# Patient Record
Sex: MALE | Race: WHITE | NOT HISPANIC OR LATINO | Employment: OTHER | URBAN - NONMETROPOLITAN AREA
[De-identification: names, ages, dates, MRNs, and addresses within clinical notes are randomized per-mention and may not be internally consistent; named-entity substitution may affect disease eponyms.]

---

## 2021-06-10 ENCOUNTER — TELEPHONE (OUTPATIENT)
Dept: URGENT CARE | Facility: CLINIC | Age: 66
End: 2021-06-10

## 2021-06-10 NOTE — TELEPHONE ENCOUNTER
There was no answer on patient's home phone. Called the mobile number given and that was his brother, Myles Nixon's number. He gave his brother's date of birth and reports that he has been checking on him. Patient gave permission for him to be a  when Myles brought him to clinic on 6/9/21. COVID positive results were shared with brother. He reports speaking to patient's mother this morning and her informing him patient was feeling some better, continuing to take his medications. Patient's brother has been vaccinated and is not symptomatic. Patient should quarantine through 6/16/21. He agrees to inform him of test results and have him back here or to PCP if no improvment, or ER if in distress.

## 2021-06-13 DIAGNOSIS — J98.4 LUNG ABNORMALITY: Primary | ICD-10-CM

## 2021-06-22 ENCOUNTER — OFFICE VISIT (OUTPATIENT)
Dept: INTERNAL MEDICINE | Facility: CLINIC | Age: 66
End: 2021-06-22

## 2021-06-22 VITALS
HEIGHT: 66 IN | SYSTOLIC BLOOD PRESSURE: 136 MMHG | BODY MASS INDEX: 43.07 KG/M2 | WEIGHT: 268 LBS | DIASTOLIC BLOOD PRESSURE: 85 MMHG | OXYGEN SATURATION: 94 % | RESPIRATION RATE: 16 BRPM | HEART RATE: 94 BPM | TEMPERATURE: 97.8 F

## 2021-06-22 DIAGNOSIS — U07.1 REAL TIME REVERSE TRANSCRIPTASE PCR POSITIVE FOR COVID-19 VIRUS: ICD-10-CM

## 2021-06-22 DIAGNOSIS — J15.9 COMMUNITY ACQUIRED BACTERIAL PNEUMONIA: Primary | ICD-10-CM

## 2021-06-22 PROCEDURE — 99203 OFFICE O/P NEW LOW 30 MIN: CPT | Performed by: INTERNAL MEDICINE

## 2021-06-22 RX ORDER — GUAIFENESIN AND CODEINE PHOSPHATE 100; 10 MG/5ML; MG/5ML
5 SOLUTION ORAL NIGHTLY PRN
Qty: 120 ML | Refills: 3 | Status: SHIPPED | OUTPATIENT
Start: 2021-06-22 | End: 2021-10-24

## 2021-06-22 NOTE — PROGRESS NOTES
Subjective   Dante Nixon is a 66 y.o. male.     Chief Complaint   Patient presents with   • Establish Care   • Cough   • Pneumonia       History of Present Illness   Patient is here for initial visit, he was seen in the urgent care initially on June 9, 2021 when he had a fever and was diagnosed with pneumonia and Covid positive he was put on Levaquin for 7 days which he completed, he was then seen again in the urgent care on June 18, 2021 and had a repeat chest x-ray and put on doxycycline with prednisone .  He is currently taking the doxycycline but he forgets to take the prednisone and is encouraged to do the same, he is also using his nebulizer, he does complain of cough, I have reviewed his chest x-rays and informed  the patient that his pneumonia is actually improving , his urgent care records from June 9, 2021 and June 18, 2021 have been reviewed, labs and x-rays have been reviewed and medication reconciled and updated, he continues to have a cough    Review of Systems   Constitutional: Negative for appetite change, fatigue and fever.   HENT: Negative for congestion, ear discharge, ear pain, sinus pressure and sore throat.    Eyes: Negative for pain and discharge.   Respiratory: Positive for cough. Negative for shortness of breath and wheezing.    Cardiovascular: Negative for chest pain, palpitations and leg swelling.   Gastrointestinal: Negative for abdominal pain, constipation, diarrhea, nausea and vomiting.   Endocrine: Negative for cold intolerance and heat intolerance.   Genitourinary: Negative for dysuria and flank pain.   Musculoskeletal: Negative for arthralgias and joint swelling.   Skin: Negative for pallor and rash.   Allergic/Immunologic: Negative for environmental allergies and food allergies.   Neurological: Negative for dizziness, weakness and numbness.   Hematological: Negative for adenopathy. Does not bruise/bleed easily.   Psychiatric/Behavioral: Negative for behavioral problems and  "dysphoric mood. The patient is not nervous/anxious.        History reviewed. No pertinent past medical history.    Past Surgical History:   Procedure Laterality Date   • APPENDECTOMY         Family History   Problem Relation Age of Onset   • Arthritis Mother         reports that he has never smoked. He has never used smokeless tobacco. He reports current alcohol use. He reports that he does not use drugs.    No Known Allergies        Current Outpatient Medications:   •  albuterol sulfate  (90 Base) MCG/ACT inhaler, Two puffs per day every 4-6 hours as needed for wheeze or shortness of breath., Disp: 18 g, Rfl: 0  •  doxycycline (MONODOX) 100 MG capsule, 1 po bid, Disp: 20 capsule, Rfl: 0  •  ipratropium-albuterol (DUO-NEB) 0.5-2.5 mg/3 ml nebulizer, Nebulize q 4 h prn wheezing / shortness of breath, Disp: 360 mL, Rfl: 0  •  predniSONE (DELTASONE) 10 MG (21) dose pack, Daily taper 6,5,4,3,2,1, Disp: 21 each, Rfl: 0  •  promethazine-dextromethorphan (PROMETHAZINE-DM) 6.25-15 MG/5ML syrup, Take 5 mL by mouth 4 (Four) Times a Day As Needed for Cough., Disp: 118 mL, Rfl: 0  •  guaiFENesin-codeine (GUAIFENESIN AC) 100-10 MG/5ML liquid, Take 5 mL by mouth At Night As Needed for Cough., Disp: 120 mL, Rfl: 3      Objective   Blood pressure 136/85, pulse 94, temperature 97.8 °F (36.6 °C), resp. rate 16, height 167.6 cm (65.98\"), weight 122 kg (268 lb), SpO2 94 %.    Physical Exam  Vitals and nursing note reviewed.   Constitutional:       General: He is not in acute distress.     Appearance: Normal appearance. He is not diaphoretic.   HENT:      Head: Normocephalic and atraumatic.      Right Ear: External ear normal.      Left Ear: External ear normal.      Nose: Nose normal.   Eyes:      Extraocular Movements: Extraocular movements intact.      Conjunctiva/sclera: Conjunctivae normal.   Neck:      Trachea: Trachea normal.   Cardiovascular:      Rate and Rhythm: Normal rate and regular rhythm.      Heart sounds: Normal " heart sounds.   Pulmonary:      Effort: Pulmonary effort is normal. No respiratory distress.   Abdominal:      General: Abdomen is flat.   Musculoskeletal:      Cervical back: Neck supple.      Right lower leg: Edema present.      Left lower leg: Edema present.      Comments: Moves all limbs   Skin:     General: Skin is warm and dry.      Findings: No erythema.   Neurological:      Mental Status: He is alert and oriented to person, place, and time.      Comments: No gross motor or sensory deficits         Patient's Body mass index is 43.28 kg/m². indicating that he is morbidly obese (BMI > 40 or > 35 with obesity - related health condition). Obesity-related health conditions include the following: none. Obesity is newly identified. BMI is is above average; BMI management plan is completed. We discussed low calorie, low carb based diet program, portion control, increasing exercise and joining a fitness center or start home based exercise program..      Results for orders placed or performed during the hospital encounter of 06/09/21   COVID-19,[facility] ROUTINE, NP/OP SWAB IN TRANSPORT MEDIA OR ESWAB 72 HR TAT - Swab, Nasopharynx    Specimen: Nasopharynx; Swab   Result Value Ref Range    SARS-CoV-2, [facility] Detected (A) Not Detected   SARS-CoV-2, [facility]TT 2 DAY TAT - Swab, Nasopharynx    Specimen: Nasopharynx; Swab   Result Value Ref Range    [facility] SARS-COV-2, [facility] 2 DAY TAT Performed        UC with China Hill MD (06/18/2021)  UC with Kiley Kwok APRN (06/09/2021)  XR Chest 2 View (06/18/2021 08:48)  XR Chest 2 View (06/09/2021 10:33)        Assessment/Plan   Diagnoses and all orders for this visit:    1. Community acquired bacterial pneumonia (Primary)  -     guaiFENesin-codeine (GUAIFENESIN AC) 100-10 MG/5ML liquid; Take 5 mL by mouth At Night As Needed for Cough.  Dispense: 120 mL; Refill: 3    2. Real time reverse transcriptase PCR positive for COVID-19 virus      Plan:  1.  Community-acquired bacterial  pneumonia: Patient advised to complete doxycycline, continue using his prednisone as prescribed and nebulizer, chest x-rays have been reviewed with patient, he is also advised to ambulate, cough medication prescribed  2.  Covid positive: Currently afebrile, will monitor  I   spent 30 minutes caring for Dante  on this date of service. This time includes time spent by me in the following activities:preparing for the visit, reviewing tests, performing a medically appropriate examination and/or evaluation , counseling and educating the patient/family/caregiver, ordering medications, tests, or procedures and documenting information in the medical record         Sera Souza MD

## 2021-06-22 NOTE — PATIENT INSTRUCTIONS
MyPlate from USDA    MyPlate is an outline of a general healthy diet based on the 2010 Dietary Guidelines for Americans, from the U.[place]. Department of Agriculture (USDA). It sets guidelines for how much food you should eat from each food group based on your age, sex, and level of physical activity.  What are tips for following MyPlate?  To follow MyPlate recommendations:  · Eat a wide variety of fruits and vegetables, grains, and protein foods.  · Serve smaller portions and eat less food throughout the day.  · Limit portion sizes to avoid overeating.  · Enjoy your food.  · Get at least 150 minutes of exercise every week. This is about 30 minutes each day, 5 or more days per week.  It can be difficult to have every meal look like MyPlate. Think about MyPlate as eating guidelines for an entire day, rather than each individual meal.  Fruits and vegetables  · Make half of your plate fruits and vegetables.  · Eat many different colors of fruits and vegetables each day.  · For a 2,000 calorie daily food plan, eat:  ? 2½ cups of vegetables every day.  ? 2 cups of fruit every day.  · 1 cup is equal to:  ? 1 cup raw or cooked vegetables.  ? 1 cup raw fruit.  ? 1 medium-sized orange, apple, or banana.  ? 1 cup 100% fruit or vegetable juice.  ? 2 cups raw leafy greens, such as lettuce, spinach, or kale.  ? ½ cup dried fruit.  Grains  · One fourth of your plate should be grains.  · Make at least half of the grains you eat each day whole grains.  · For a 2,000 calorie daily food plan, eat 6 oz of grains every day.  · 1 oz is equal to:  ? 1 slice bread.  ? 1 cup cereal.  ? ½ cup cooked rice, cereal, or pasta.  Protein  · One fourth of your plate should be protein.  · Eat a wide variety of protein foods, including meat, poultry, fish, eggs, beans, nuts, and tofu.  · For a 2,000 calorie daily food plan, eat 5½ oz of protein every day.  · 1 oz is equal to:  ? 1 oz meat, poultry, or fish.  ? ¼ cup cooked beans.  ? 1 egg.  ? ½ oz nuts  or seeds.  ? 1 Tbsp peanut butter.  Dairy  · Drink fat-free or low-fat (1%) milk.  · Eat or drink dairy as a side to meals.  · For a 2,000 calorie daily food plan, eat or drink 3 cups of dairy every day.  · 1 cup is equal to:  ? 1 cup milk, yogurt, cottage cheese, or soy milk (soy beverage).  ? 2 oz processed cheese.  ? 1½ oz natural cheese.  Fats, oils, salt, and sugars  · Only small amounts of oils are recommended.  · Avoid foods that are high in calories and low in nutritional value (empty calories), like foods high in fat or added sugars.  · Choose foods that are low in salt (sodium). Choose foods that have less than 140 milligrams (mg) of sodium per serving.  · Drink water instead of sugary drinks. Drink enough water each day to keep your urine pale yellow.  Where to find support  · Work with your health care provider or a nutrition specialist (dietitian) to develop a customized eating plan that is right for you.  · Download an fabiana (mobile application) to help you track your daily food intake.  Where to find more information  · Go to ChooseMyPlate.gov for more information.  Summary  · MyPlate is a general guideline for healthy eating from the USDA. It is based on the 2010 Dietary Guidelines for Americans.  · In general, fruits and vegetables should take up ½ of your plate, grains should take up ¼ of your plate, and protein should take up ¼ of your plate.  This information is not intended to replace advice given to you by your health care provider. Make sure you discuss any questions you have with your health care provider.  Document Revised: 05/21/2020 Document Reviewed: 03/19/2018  Elsevier Patient Education © 2021 [employer] Inc.

## 2021-07-07 ENCOUNTER — OFFICE VISIT (OUTPATIENT)
Dept: INTERNAL MEDICINE | Facility: CLINIC | Age: 66
End: 2021-07-07

## 2021-07-07 ENCOUNTER — HOSPITAL ENCOUNTER (OUTPATIENT)
Dept: GENERAL RADIOLOGY | Facility: HOSPITAL | Age: 66
Discharge: HOME | End: 2021-07-07
Admitting: INTERNAL MEDICINE

## 2021-07-07 VITALS
WEIGHT: 259 LBS | HEART RATE: 94 BPM | RESPIRATION RATE: 16 BRPM | HEIGHT: 66 IN | BODY MASS INDEX: 41.62 KG/M2 | OXYGEN SATURATION: 95 % | DIASTOLIC BLOOD PRESSURE: 76 MMHG | TEMPERATURE: 98.6 F | SYSTOLIC BLOOD PRESSURE: 132 MMHG

## 2021-07-07 DIAGNOSIS — J12.82 PNEUMONIA DUE TO COVID-19 VIRUS: Primary | ICD-10-CM

## 2021-07-07 DIAGNOSIS — U07.1 PNEUMONIA DUE TO COVID-19 VIRUS: Primary | ICD-10-CM

## 2021-07-07 DIAGNOSIS — R60.0 LOCALIZED EDEMA: ICD-10-CM

## 2021-07-07 DIAGNOSIS — E78.2 MIXED HYPERLIPIDEMIA: ICD-10-CM

## 2021-07-07 PROCEDURE — 71046 X-RAY EXAM CHEST 2 VIEWS: CPT

## 2021-07-07 PROCEDURE — 99214 OFFICE O/P EST MOD 30 MIN: CPT | Performed by: INTERNAL MEDICINE

## 2021-07-07 RX ORDER — FUROSEMIDE 40 MG/1
40 TABLET ORAL DAILY
Qty: 30 TABLET | Refills: 1 | Status: SHIPPED | OUTPATIENT
Start: 2021-07-07 | End: 2021-10-24

## 2021-07-07 RX ORDER — POTASSIUM CHLORIDE 1500 MG/1
20 TABLET, FILM COATED, EXTENDED RELEASE ORAL DAILY
Qty: 30 TABLET | Refills: 1 | Status: SHIPPED | OUTPATIENT
Start: 2021-07-07 | End: 2021-10-24

## 2021-07-07 NOTE — PROGRESS NOTES
"Chief Complaint  Follow-up (on pneumonia)    Subjective          Dante Nixon presents to [facility]  History of Present Illness  Patient is here to follow-up on Covid 19 which he was diagnosed with last month, he then had Covid pneumonia for which he was seen in the urgent care and completed his antibiotics and steroids, he states he feels better but he now complains of leg swelling which has been worsening in both his legs    Objective   Vital Signs:   /76   Pulse 94   Temp 98.6 °F (37 °C)   Resp 16   Ht 167.6 cm (65.98\")   Wt 117 kg (259 lb)   SpO2 95%   BMI 41.82 kg/m²     Physical Exam  Vitals and nursing note reviewed.   Constitutional:       General: He is not in acute distress.     Appearance: Normal appearance. He is not diaphoretic.   HENT:      Head: Normocephalic and atraumatic.      Right Ear: External ear normal.      Left Ear: External ear normal.      Nose: Nose normal.   Eyes:      Extraocular Movements: Extraocular movements intact.      Conjunctiva/sclera: Conjunctivae normal.   Neck:      Trachea: Trachea normal.   Cardiovascular:      Rate and Rhythm: Normal rate and regular rhythm.      Heart sounds: Normal heart sounds.   Pulmonary:      Effort: Pulmonary effort is normal. No respiratory distress.   Abdominal:      General: Abdomen is flat.   Musculoskeletal:      Cervical back: Neck supple.      Right lower leg: Edema present.      Left lower leg: Edema present.      Comments: Moves all limbs   Skin:     General: Skin is warm and dry.      Findings: No erythema.   Neurological:      Mental Status: He is alert and oriented to person, place, and time.      Comments: No gross motor or sensory deficits        Result Review :                   Assessment and Plan    Diagnoses and all orders for this visit:    1. Pneumonia due to COVID-19 virus (Primary)  -     XR Chest PA & Lateral    2. Localized edema  -     furosemide (Lasix) 40 MG tablet; Take " 1 tablet by mouth Daily.  Dispense: 30 tablet; Refill: 1  -     potassium chloride (K-TAB) 20 MEQ tablet controlled-release ER tablet; Take 1 tablet by mouth Daily.  Dispense: 30 tablet; Refill: 1    3. Mixed hyperlipidemia  -     CBC & Differential  -     Comprehensive Metabolic Panel  -     Lipid Panel    Plan:  1.  mixed hyperlipidemia: will obtain   fasting CMP and lipid panel.  Diet and exercise counseled,    2.  Edema: We will start diuretic, low-sodium diet and obtain labs  3.  COVID-19 pneumonia: We will obtain chest x-ray  I spent 30 minutes caring for Dante on this date of service. This time includes time spent by me in the following activities:preparing for the visit, reviewing tests, performing a medically appropriate examination and/or evaluation , counseling and educating the patient/family/caregiver, ordering medications, tests, or procedures and documenting information in the medical record  Follow Up   Return in about 27 days (around 8/3/2021).  Patient was given instructions and counseling regarding his condition or for health maintenance advice. Please see specific information pulled into the AVS if appropriate.

## 2021-07-08 DIAGNOSIS — J18.9 PLEURAL EFFUSION ASSOCIATED WITH PULMONARY INFECTION: Primary | ICD-10-CM

## 2021-07-08 DIAGNOSIS — J91.8 PLEURAL EFFUSION ASSOCIATED WITH PULMONARY INFECTION: Primary | ICD-10-CM

## 2021-07-08 NOTE — PROGRESS NOTES
Chest xray -pneumonia has cleared up and he can ambulate and go outside, but he does have  small bilateral pleural effusions which will take a a little while to clear up but I will set him up to see pulmonology for the same

## 2021-08-16 ENCOUNTER — OFFICE VISIT (OUTPATIENT)
Dept: PULMONOLOGY | Facility: CLINIC | Age: 66
End: 2021-08-16

## 2021-08-16 ENCOUNTER — HOSPITAL ENCOUNTER (OUTPATIENT)
Dept: GENERAL RADIOLOGY | Facility: HOSPITAL | Age: 66
Discharge: HOME | End: 2021-08-16
Admitting: INTERNAL MEDICINE

## 2021-08-16 VITALS
SYSTOLIC BLOOD PRESSURE: 150 MMHG | OXYGEN SATURATION: 97 % | HEIGHT: 66 IN | DIASTOLIC BLOOD PRESSURE: 92 MMHG | WEIGHT: 254 LBS | BODY MASS INDEX: 40.82 KG/M2 | HEART RATE: 88 BPM | RESPIRATION RATE: 18 BRPM

## 2021-08-16 DIAGNOSIS — R93.89 ABNORMAL CXR: Primary | ICD-10-CM

## 2021-08-16 DIAGNOSIS — R06.83 SNORING: ICD-10-CM

## 2021-08-16 DIAGNOSIS — J90 PLEURAL EFFUSION: ICD-10-CM

## 2021-08-16 DIAGNOSIS — G47.19 EXCESSIVE DAYTIME SLEEPINESS: ICD-10-CM

## 2021-08-16 DIAGNOSIS — R60.9 EDEMA, UNSPECIFIED TYPE: ICD-10-CM

## 2021-08-16 DIAGNOSIS — G47.33 OBSTRUCTIVE SLEEP APNEA: ICD-10-CM

## 2021-08-16 DIAGNOSIS — R93.89 ABNORMAL CXR: ICD-10-CM

## 2021-08-16 DIAGNOSIS — E66.01 MORBID OBESITY, UNSPECIFIED OBESITY TYPE (HCC): ICD-10-CM

## 2021-08-16 PROCEDURE — 71046 X-RAY EXAM CHEST 2 VIEWS: CPT

## 2021-08-16 PROCEDURE — 99244 OFF/OP CNSLTJ NEW/EST MOD 40: CPT | Performed by: INTERNAL MEDICINE

## 2021-08-16 NOTE — PROGRESS NOTES
CONSULT NOTE    Requested by:   Sera Souza MD D'Costa, Gina, MD      Chief Complaint   Patient presents with   • Consult   • Breathing Problem       Subjective:  Dante Nixon is a 66 y.o. male.     History of Present Illness   Patient comes in today for consultation because of shortness of breath and abnormal chest x-ray.    The patient says that he developed CoVid in June and initially his cough was significant along with fever and chills.  The patient says that he was never admitted to the hospital but was told by his primary care provider that he had pneumonia with it.    The patient says that he continued to have shortness of breath and cough for a few weeks after infection resolved but lately shortness of breath has mostly resolved although he does have occasional mild cough.    Upon questioning, he is complaining of sleep disturbance. Patient says that for the past few years, he has had trouble with snoring.     he is also complaining of occasionally feeling sleepy while watching TV and sometimes while reading a book.    Patient also mentions occasional nights when he remembers having nightmares and has woken up due to the same.      he is not complaining of occasional headaches.    Patient's sleep schedule was reviewed. he drinks 2-3 cups/cans of caffeinated drinks per day.     he does have a family history of YENNIFER, in his brother.         The following portions of the patient's history were reviewed and updated as appropriate: allergies, current medications, past family history, past medical history, past social history and past surgical history.    Review of Systems   Constitutional: Negative for chills, fatigue and fever.   HENT: Positive for postnasal drip. Negative for rhinorrhea, sinus pressure, sneezing and sore throat.    Respiratory: Positive for cough. Negative for chest tightness, shortness of breath and wheezing.    Cardiovascular: Negative for palpitations and leg swelling.  "  Psychiatric/Behavioral: Negative for sleep disturbance.   All other systems reviewed and are negative.      No past medical history on file.    Social History     Tobacco Use   • Smoking status: Never Smoker   • Smokeless tobacco: Never Used   Substance Use Topics   • Alcohol use: Yes     Comment: social         Objective:  Visit Vitals  /92   Pulse 88   Resp 18   Ht 167.6 cm (66\")   Wt 115 kg (254 lb)   SpO2 97%   BMI 41.00 kg/m²       Physical Exam  Vitals reviewed.   Constitutional:       Appearance: He is well-developed.   HENT:      Head: Atraumatic.      Mouth/Throat:      Comments: Oropharynx was crowded.  Eyes:      Pupils: Pupils are equal, round, and reactive to light.   Neck:      Thyroid: No thyromegaly.      Vascular: No JVD.      Trachea: No tracheal deviation.      Comments: Increased adipose tissue.   Cardiovascular:      Rate and Rhythm: Normal rate and regular rhythm.   Pulmonary:      Effort: Pulmonary effort is normal. No respiratory distress.      Breath sounds: Normal breath sounds. No wheezing.   Musculoskeletal:         General: Normal range of motion.      Right lower leg: Edema present.      Left lower leg: Edema present.      Comments: Gait was normal.   Skin:     General: Skin is warm and dry.   Neurological:      Mental Status: He is alert and oriented to person, place, and time.   Psychiatric:         Behavior: Behavior normal.         Assessment/Plan:  Diagnoses and all orders for this visit:    1. Abnormal CXR (Primary)  -     XR Chest PA & Lateral; Future    2. Pleural effusion  -     XR Chest PA & Lateral; Future    3. Obstructive sleep apnea  -     Home Sleep Study; Future    4. Morbid obesity, unspecified obesity type (CMS/HCC)  -     Home Sleep Study; Future    5. Snoring  -     Home Sleep Study; Future    6. Excessive daytime sleepiness  -     Home Sleep Study; Future    7. Edema, unspecified type        Return in about 10 weeks (around 10/25/2021) for Recheck, Imaging, " Give Sleep quest, Sleep study, For Analia.    DISCUSSION(if any):  Last chest x-ray was reviewed personally and the results were shared with the patient.  Images reviewed personally.   Results for orders placed in visit on 07/07/21    XR Chest PA & Lateral    Narrative  EXAMINATION: XR CHEST PA AND LATERAL - 07/07/2021    INDICATION: U07.1-COVID-19; J12.82-Pneumonia due to Coronavirus disease  2019. Follow-up pneumonia.    COMPARISON: None.    FINDINGS: PA and lateral chest reveals small bilateral pleural  effusions, left greater than right. Degenerative changes seen within the  spine. The heart is borderline. Lung fields are otherwise clear.    Impression  Small bilateral pleural effusions left greater than right.  The remainder of the lung fields are grossly clear.    DICTATED:   07/07/2021  EDITED/ls :   07/07/2021    This report was finalized on 7/8/2021 5:16 PM by Dr. Jazlyn Roy MD.    I have also reviewed his provider's office note that mentions CoVid pneumonia and documented antibiotics and steroids, given by . It also     Reviewed labs. CoVid test was positive.     ===========================  ===========================    Pleural effusion likely due to CoVid related pneumonia?    CXR today.    He was asked to resume Lasix for 1 week, given significant edema.    He was asked to call his PCP, if the edema doesn't resolve or persists after taking Lasix for 1 week.    He had no issues with Lasix, when he took it a few weeks ago.    He was reminded about CoVid vaccine.    Sleep questionnaire was provided to the patient    The pathophysiology of sleep apnea was discussed, with the patient.     We will encourage him to schedule the sleep study soon.     The patient was made aware of the limitation of the home sleep study, whereby it may underestimate the true AHI and also carries a low sensitivity.  I have informed him that even if the home sleep study is negative, we may suggest an in lab sleep study  to completely and definitively rule out/in sleep apnea.  The patient has understood.  This was communicated to the patient, in case home study is to be requested.    The patient is agreeable to try CPAP/BiPAP, if needed.     Patient was educated on good sleep hygiene measures and voiced understanding of the same.     Patient was given reading material regarding sleep apnea    Patient was counseled regarding weight loss.       Dictated utilizing Dragon dictation.    This document was electronically signed by Sujata Mccollum MD on 08/16/21 at 11:54 EDT

## 2021-10-11 ENCOUNTER — APPOINTMENT (OUTPATIENT)
Dept: SLEEP MEDICINE | Facility: HOSPITAL | Age: 66
End: 2021-10-11

## 2021-10-25 ENCOUNTER — PREP FOR SURGERY (OUTPATIENT)
Dept: OTHER | Facility: HOSPITAL | Age: 66
End: 2021-10-25

## 2021-10-25 ENCOUNTER — OFFICE VISIT (OUTPATIENT)
Dept: ORTHOPEDIC SURGERY | Facility: CLINIC | Age: 66
End: 2021-10-25

## 2021-10-25 VITALS — BODY MASS INDEX: 41.56 KG/M2 | WEIGHT: 258.6 LBS | TEMPERATURE: 98.4 F | HEIGHT: 66 IN

## 2021-10-25 DIAGNOSIS — S52.572A OTHER CLOSED INTRA-ARTICULAR FRACTURE OF DISTAL END OF LEFT RADIUS, INITIAL ENCOUNTER: Primary | ICD-10-CM

## 2021-10-25 DIAGNOSIS — S69.92XA WRIST INJURY, LEFT, INITIAL ENCOUNTER: Primary | ICD-10-CM

## 2021-10-25 PROBLEM — S52.502A CLOSED FRACTURE OF LEFT DISTAL RADIUS: Status: ACTIVE | Noted: 2021-10-25

## 2021-10-25 PROCEDURE — 99203 OFFICE O/P NEW LOW 30 MIN: CPT | Performed by: ORTHOPAEDIC SURGERY

## 2021-10-25 RX ORDER — HYDROCODONE BITARTRATE AND ACETAMINOPHEN 5; 325 MG/1; MG/1
1 TABLET ORAL EVERY 8 HOURS PRN
Qty: 21 TABLET | Refills: 0 | Status: SHIPPED | OUTPATIENT
Start: 2021-10-25 | End: 2021-12-27

## 2021-10-25 RX ORDER — CEFAZOLIN SODIUM 2 G/50ML
2 SOLUTION INTRAVENOUS
Status: CANCELLED | OUTPATIENT
Start: 2021-10-26 | End: 2021-10-27

## 2021-10-25 NOTE — PROGRESS NOTES
"Dante Nixon is a 66 y.o. male referred by [facility] and is being seen for consultation today to evaluate and treat a traumatic left wrist fracture condition.  Injury of the Left Wrist (Referred by [facility] for left wrist injury. )       CHIEF COMPLAINT:     Left wrist pain after accidental mechanical fall.    History of Present Illness      Patient states he was on his back porch on 10/21/21 after dark, and the security light did not come on, and he misjudged the porch steps and fell down 2 steps, landed on his outstretched left arm/wrist/hand. No reported head trauma or loss of consciousness.  He decided to wait three days telling me he was initially not sure if his wrist was broken and then with persistent pain and swelling he went to [facility] on 10/24/21. Exam and imaging reveal an acute, closed, severely comminuted, displaced, unstable left distal radius fracture.  Of note, patient has past medical history that includes diabetes, hypertension, anxiety disorder, and patient has recently recovered from Covid pneumonia in June, 2021.      PAST MEDICAL HISTORY:    Past Medical History:   Diagnosis Date   • Anxiety     Does not take medication for this   • Borderline diabetes 10/26/2021    Reported he has never been on medication for diabetes    • Fall 10/21/2021    Reslting in fracture of left wrist   • H/O cardiovascular stress test     Patient reported around 1984 and that he had an exercise stress test and all wnl's at that time   • History of COVID-19 06/09/2021   • History of pneumonia    • Hypertension     Patient reported \"it's a little bit high\" but reported he has never taken Rx for this    • Snores 10/26/2021    patient reported he has never had a sleep study   • Wears glasses     Rx glasses PRN       Past Surgical History:   Procedure Laterality Date   • APPENDECTOMY      Reported at around age 12   • COLONOSCOPY      reported many years ago        Family History   Problem Relation Age " "of Onset   • Arthritis Mother        Social History     Socioeconomic History   • Marital status: Single   Tobacco Use   • Smoking status: Never Smoker   • Smokeless tobacco: Never Used   Vaping Use   • Vaping Use: Never used   Substance and Sexual Activity   • Alcohol use: Yes     Alcohol/week: 4.0 standard drinks     Types: 4 Cans of beer per week     Comment: Reported no Hx of ETOH abuse   • Drug use: Never   • Sexual activity: Defer          Current Outpatient Medications:   •  HYDROcodone-acetaminophen (NORCO) 5-325 MG per tablet, Take 1 tablet by mouth Every 8 (Eight) Hours As Needed for Other (PRN PAIN)., Disp: 21 tablet, Rfl: 0    No Known Allergies    Review of Systems   Constitutional: Negative for fever.   Musculoskeletal: Positive for arthralgias and joint swelling. Negative for gait problem.   Skin: Positive for color change (mild ecchymosis distal forearm. 2+ radial pulse, brisk cap refill.). Negative for wound.   Neurological: Positive for weakness (left wrist, no focal motor deficit though limited due to pain and swelling,  No tense compartments, no pain with passive digital stretch, no sign of compartment syndrome. ).     I have reviewed the medical and surgical history, family history, social history, medications, and/or allergies, and the review of systems of this report.    PHYSICAL EXAMINATION:    Temp 98.4 °F (36.9 °C)   Ht 167.6 cm (66\")   Wt 117 kg (258 lb 9.6 oz)   BMI 41.74 kg/m²     GENERAL [x] Well developed  []Ill appearing [x] No acute distress    HEENT [x]No acute changes [x] Normocephalic, atraumatic    NECK [x]Supple  [] No midline tenderness    LUNGS [x]Clear bilaterally [x]No wheezes []Rhonchi [] Rales    HEART [x] Regular rate  [x] Regular rhythm [] Irregular    ABDOMEN [x] Soft [x] Not tender [x] Not distended [x] Normal sounds    VAS/EXT [x] Normal Pulses []Edema []Cyanosis             SKIN [x] Warm [x]Dry []Pink []Ecchymosis []Cool    NEURO [x] Sensation Intact [x] Motor " Grossly Intact [x] Pulse intact    MUSCULOSKELETAL [x]  As noted above in review of systems.      Physical Exam  Vitals reviewed.   Constitutional:       General: He is not in acute distress.     Appearance: He is well-developed.   Skin:     General: Skin is warm and dry.      Findings: No erythema or rash.   Neurological:      Mental Status: He is alert.      Gait: Gait is intact.   Psychiatric:         Speech: Speech normal.       Left Hand Exam     Tenderness   The patient is experiencing tenderness in the radial area, palmar area and dorsal area.     Other   Erythema: absent  Pulse: present           Neurologic Exam     Mental Status   Attention: normal.   Speech: speech is normal   Level of consciousness: alert  Knowledge: good.     Motor Exam   Overall muscle tone: normal    Gait, Coordination, and Reflexes     Gait  Gait: normal      Assessment/Plan     Independent Review of Radiographic Studies:    No new imaging done today.  Reviewed relevant prior imaging with patient again today.  Reviewed images and agree with radiologist interpretation and as detailed above in this report.    Laboratory and Other Studies:  Pre-op labs and studies ordered.     Medical Decision Making:    Stable initial neurovascular and fracture exam.  Surgical treatment of fracture and or dislocation.  Medications as prescribed and only as tolerated.  Physical and occupational therapy planned.  Decision for surgery and patient counseling as noted in report.  Activity, work and/or sports restrictions as appropriate.      Diagnoses and all orders for this visit:    1. Wrist injury, left, initial encounter (Primary)  -     HYDROcodone-acetaminophen (NORCO) 5-325 MG per tablet; Take 1 tablet by mouth Every 8 (Eight) Hours As Needed for Other (PRN PAIN).  Dispense: 21 tablet; Refill: 0        Assessment/Plan:    *SPECIAL INSTRUCTIONS:      Multi-modal analgesia.      Rehabilitation PT/OT planned.  [x]  If pre-op labs and tests warrant, plan  pre-operative medical clearance evaluation     Discussion of orthopaedic goals and activities and patient and/or guardian expressed appreciation.  Risk, benefits, and merits of treatment alternatives reviewed with the patient and/or guardian and questions answered  The nature of the proposed surgery reviewed with the patient including risks, benefits, rehabilitation, recovery timeframe, and outcome expectations  Ice, heat, and/or modalities as beneficial  Weight bearing parameters reviewed  Cast, splint or brace care and assistive device usage instructions given  Physical therapy program planned  Take prescribed medications as instructed only as tolerated      Risks, benefits, and alternative treatments discussed with the patient: [x] Yes [] No    Risk benefits and merits of the proposed surgery were discussed and the patient's questions were answered risks discussed including and not limited to:  Anesthesia reactions  Infection  Deep venous thrombosis and pulmonary embolus  Nerve, vascular or tendon injury  Fracture  Deformity  Stiffness  Weakness  Prosthesis and implant issues such as loosening, material wear or dislocation  Skin necrosis  Revision surgery or further treatment  Recurrence of problem and condition     Informed consent: [] Signed  [x] To be obtained at hospital  [] Both    Recommendations/Plan:    Exercise, medications, injections, other patient advice, and return appointment as noted.  Brace: No brace was given at today's visit.  Referral: No referrals made at today's visit.  Test/Studies: Pre-op labs and tests.  Surgery: Surgery proposed at this visit as noted.  Work/Activity Status: Usual activities, routine exercise as tolerated, no strenuous activity. No driving. No use of involved extremity.    PLANNED SURGICAL PROCEDURE: Left wrist open reduction internal fixation with volar plate.    Return in about 17 days (around 11/11/2021) for Post-op, recheck, XOA left wrist.  Patient is encouraged and  agreeable to call or return sooner for any issues or concerns.

## 2021-10-26 ENCOUNTER — HOSPITAL ENCOUNTER (OUTPATIENT)
Dept: GENERAL RADIOLOGY | Facility: HOSPITAL | Age: 66
Discharge: HOME | End: 2021-10-26

## 2021-10-26 ENCOUNTER — PRE-ADMISSION TESTING (OUTPATIENT)
Dept: PREADMISSION TESTING | Facility: HOSPITAL | Age: 66
End: 2021-10-26

## 2021-10-26 VITALS — WEIGHT: 255 LBS | BODY MASS INDEX: 40.98 KG/M2 | HEIGHT: 66 IN

## 2021-10-26 DIAGNOSIS — S52.572A OTHER CLOSED INTRA-ARTICULAR FRACTURE OF DISTAL END OF LEFT RADIUS, INITIAL ENCOUNTER: ICD-10-CM

## 2021-10-26 LAB
ALBUMIN SERPL-MCNC: 4.5 G/DL (ref 3.5–5.2)
ALBUMIN/GLOB SERPL: 1.2 G/DL
ALP SERPL-CCNC: 72 U/L (ref 39–117)
ALT SERPL W P-5'-P-CCNC: 14 U/L (ref 1–41)
ANION GAP SERPL CALCULATED.3IONS-SCNC: 12.1 MMOL/L (ref 5–15)
AST SERPL-CCNC: 16 U/L (ref 1–40)
BACTERIA UR QL AUTO: ABNORMAL /HPF
BASOPHILS # BLD AUTO: 0.05 10*3/MM3 (ref 0–0.2)
BASOPHILS NFR BLD AUTO: 0.5 % (ref 0–1.5)
BILIRUB SERPL-MCNC: 0.6 MG/DL (ref 0–1.2)
BILIRUB UR QL STRIP: NEGATIVE
BUN SERPL-MCNC: 16 MG/DL (ref 8–23)
BUN/CREAT SERPL: 17.8 (ref 7–25)
CALCIUM SPEC-SCNC: 10 MG/DL (ref 8.6–10.5)
CHLORIDE SERPL-SCNC: 101 MMOL/L (ref 98–107)
CLARITY UR: CLEAR
CO2 SERPL-SCNC: 25.9 MMOL/L (ref 22–29)
COD CRY URNS QL: ABNORMAL /HPF
COLOR UR: YELLOW
CREAT SERPL-MCNC: 0.9 MG/DL (ref 0.76–1.27)
DEPRECATED RDW RBC AUTO: 44 FL (ref 37–54)
EOSINOPHIL # BLD AUTO: 0.11 10*3/MM3 (ref 0–0.4)
EOSINOPHIL NFR BLD AUTO: 1 % (ref 0.3–6.2)
ERYTHROCYTE [DISTWIDTH] IN BLOOD BY AUTOMATED COUNT: 14.5 % (ref 12.3–15.4)
GFR SERPL CREATININE-BSD FRML MDRD: 85 ML/MIN/1.73
GLOBULIN UR ELPH-MCNC: 3.7 GM/DL
GLUCOSE SERPL-MCNC: 121 MG/DL (ref 65–99)
GLUCOSE UR STRIP-MCNC: NEGATIVE MG/DL
HCT VFR BLD AUTO: 42.1 % (ref 37.5–51)
HGB BLD-MCNC: 14.3 G/DL (ref 13–17.7)
HGB UR QL STRIP.AUTO: NEGATIVE
HYALINE CASTS UR QL AUTO: ABNORMAL /LPF
IMM GRANULOCYTES # BLD AUTO: 0.03 10*3/MM3 (ref 0–0.05)
IMM GRANULOCYTES NFR BLD AUTO: 0.3 % (ref 0–0.5)
KETONES UR QL STRIP: ABNORMAL
LEUKOCYTE ESTERASE UR QL STRIP.AUTO: NEGATIVE
LYMPHOCYTES # BLD AUTO: 1.84 10*3/MM3 (ref 0.7–3.1)
LYMPHOCYTES NFR BLD AUTO: 17 % (ref 19.6–45.3)
MCH RBC QN AUTO: 28.4 PG (ref 26.6–33)
MCHC RBC AUTO-ENTMCNC: 34 G/DL (ref 31.5–35.7)
MCV RBC AUTO: 83.5 FL (ref 79–97)
MONOCYTES # BLD AUTO: 0.93 10*3/MM3 (ref 0.1–0.9)
MONOCYTES NFR BLD AUTO: 8.6 % (ref 5–12)
MUCOUS THREADS URNS QL MICRO: ABNORMAL /HPF
NEUTROPHILS NFR BLD AUTO: 7.89 10*3/MM3 (ref 1.7–7)
NEUTROPHILS NFR BLD AUTO: 72.6 % (ref 42.7–76)
NITRITE UR QL STRIP: NEGATIVE
NRBC BLD AUTO-RTO: 0 /100 WBC (ref 0–0.2)
PH UR STRIP.AUTO: 6.5 [PH] (ref 5–8)
PLATELET # BLD AUTO: 284 10*3/MM3 (ref 140–450)
PMV BLD AUTO: 10.5 FL (ref 6–12)
POTASSIUM SERPL-SCNC: 3.8 MMOL/L (ref 3.5–5.2)
PROT SERPL-MCNC: 8.2 G/DL (ref 6–8.5)
PROT UR QL STRIP: ABNORMAL
RBC # BLD AUTO: 5.04 10*6/MM3 (ref 4.14–5.8)
RBC # UR: ABNORMAL /HPF
REF LAB TEST METHOD: ABNORMAL
SODIUM SERPL-SCNC: 139 MMOL/L (ref 136–145)
SP GR UR STRIP: 1.02 (ref 1–1.03)
SQUAMOUS #/AREA URNS HPF: ABNORMAL /HPF
UROBILINOGEN UR QL STRIP: ABNORMAL
WBC # BLD AUTO: 10.85 10*3/MM3 (ref 3.4–10.8)
WBC UR QL AUTO: ABNORMAL /HPF

## 2021-10-26 PROCEDURE — 93005 ELECTROCARDIOGRAM TRACING: CPT

## 2021-10-26 PROCEDURE — 87081 CULTURE SCREEN ONLY: CPT

## 2021-10-26 PROCEDURE — C9803 HOPD COVID-19 SPEC COLLECT: HCPCS

## 2021-10-26 PROCEDURE — 85025 COMPLETE CBC W/AUTO DIFF WBC: CPT

## 2021-10-26 PROCEDURE — U0004 COV-19 TEST NON-CDC HGH THRU: HCPCS

## 2021-10-26 PROCEDURE — 71046 X-RAY EXAM CHEST 2 VIEWS: CPT

## 2021-10-26 PROCEDURE — 93010 ELECTROCARDIOGRAM REPORT: CPT | Performed by: INTERNAL MEDICINE

## 2021-10-26 PROCEDURE — 81001 URINALYSIS AUTO W/SCOPE: CPT

## 2021-10-26 PROCEDURE — 36415 COLL VENOUS BLD VENIPUNCTURE: CPT

## 2021-10-26 PROCEDURE — 80053 COMPREHEN METABOLIC PANEL: CPT

## 2021-10-27 LAB
QT INTERVAL: 416 MS
QTC INTERVAL: 468 MS
SARS-COV-2 RNA NOSE QL NAA+PROBE: NOT DETECTED

## 2021-10-28 ENCOUNTER — HOSPITAL ENCOUNTER (OUTPATIENT)
Facility: HOSPITAL | Age: 66
Setting detail: HOSPITAL OUTPATIENT SURGERY
Discharge: HOME | End: 2021-10-28
Attending: ORTHOPAEDIC SURGERY | Admitting: ORTHOPAEDIC SURGERY

## 2021-10-28 ENCOUNTER — ANESTHESIA EVENT (OUTPATIENT)
Dept: PERIOP | Facility: HOSPITAL | Age: 66
End: 2021-10-28

## 2021-10-28 ENCOUNTER — APPOINTMENT (OUTPATIENT)
Dept: GENERAL RADIOLOGY | Facility: HOSPITAL | Age: 66
End: 2021-10-28

## 2021-10-28 ENCOUNTER — APPOINTMENT (OUTPATIENT)
Dept: ULTRASOUND IMAGING | Facility: HOSPITAL | Age: 66
End: 2021-10-28

## 2021-10-28 ENCOUNTER — ANESTHESIA (OUTPATIENT)
Dept: PERIOP | Facility: HOSPITAL | Age: 66
End: 2021-10-28

## 2021-10-28 VITALS
OXYGEN SATURATION: 98 % | SYSTOLIC BLOOD PRESSURE: 141 MMHG | TEMPERATURE: 97.2 F | HEART RATE: 90 BPM | RESPIRATION RATE: 20 BRPM | DIASTOLIC BLOOD PRESSURE: 96 MMHG

## 2021-10-28 DIAGNOSIS — S52.572A OTHER CLOSED INTRA-ARTICULAR FRACTURE OF DISTAL END OF LEFT RADIUS, INITIAL ENCOUNTER: ICD-10-CM

## 2021-10-28 LAB — MRSA SPEC QL CULT: NORMAL

## 2021-10-28 PROCEDURE — 25010000002 MIDAZOLAM PER 1MG: Performed by: NURSE ANESTHETIST, CERTIFIED REGISTERED

## 2021-10-28 PROCEDURE — C1713 ANCHOR/SCREW BN/BN,TIS/BN: HCPCS | Performed by: ORTHOPAEDIC SURGERY

## 2021-10-28 PROCEDURE — 25609 OPTX DST RD XART FX/EP SEP3+: CPT | Performed by: ORTHOPAEDIC SURGERY

## 2021-10-28 PROCEDURE — 0 CEFAZOLIN SODIUM-DEXTROSE 2-3 GM-%(50ML) RECONSTITUTED SOLUTION: Performed by: ORTHOPAEDIC SURGERY

## 2021-10-28 PROCEDURE — 25010000002 FENTANYL CITRATE (PF) 50 MCG/ML SOLUTION: Performed by: NURSE ANESTHETIST, CERTIFIED REGISTERED

## 2021-10-28 PROCEDURE — 0 CEFAZOLIN PER 500 MG: Performed by: ORTHOPAEDIC SURGERY

## 2021-10-28 PROCEDURE — 25010000002 PROPOFOL 10 MG/ML EMULSION: Performed by: NURSE ANESTHETIST, CERTIFIED REGISTERED

## 2021-10-28 PROCEDURE — 76000 FLUOROSCOPY <1 HR PHYS/QHP: CPT

## 2021-10-28 DEVICE — SCRW LK VA W STRDRV 2.4X14MM: Type: IMPLANTABLE DEVICE | Site: WRIST | Status: FUNCTIONAL

## 2021-10-28 DEVICE — IMPLANTABLE DEVICE: Type: IMPLANTABLE DEVICE | Site: WRIST | Status: FUNCTIONAL

## 2021-10-28 DEVICE — SCRW CORT S/TAP STRDRV 2.4X24MM: Type: IMPLANTABLE DEVICE | Site: WRIST | Status: FUNCTIONAL

## 2021-10-28 DEVICE — SCRW CORT S/TAP STRDRV 2.4X20MM: Type: IMPLANTABLE DEVICE | Site: WRIST | Status: FUNCTIONAL

## 2021-10-28 DEVICE — SCRW LK VA W STRDRV 2.4X16MM: Type: IMPLANTABLE DEVICE | Site: WRIST | Status: FUNCTIONAL

## 2021-10-28 DEVICE — SCRW CORT S/TAP STRDRV 2.7X14MM: Type: IMPLANTABLE DEVICE | Site: WRIST | Status: FUNCTIONAL

## 2021-10-28 DEVICE — SCRW LK VA W STRDRV 2.4X22MM: Type: IMPLANTABLE DEVICE | Site: WRIST | Status: FUNCTIONAL

## 2021-10-28 RX ORDER — CEFAZOLIN SODIUM 2 G/50ML
2 SOLUTION INTRAVENOUS
Status: DISCONTINUED | OUTPATIENT
Start: 2021-10-29 | End: 2021-10-28

## 2021-10-28 RX ORDER — MIDAZOLAM HYDROCHLORIDE 2 MG/2ML
INJECTION, SOLUTION INTRAMUSCULAR; INTRAVENOUS AS NEEDED
Status: DISCONTINUED | OUTPATIENT
Start: 2021-10-28 | End: 2021-10-28 | Stop reason: SURG

## 2021-10-28 RX ORDER — CEFAZOLIN SODIUM 2 G/50ML
2 SOLUTION INTRAVENOUS
Status: COMPLETED | OUTPATIENT
Start: 2021-10-28 | End: 2021-10-28

## 2021-10-28 RX ORDER — SODIUM CHLORIDE 0.9 % (FLUSH) 0.9 %
10 SYRINGE (ML) INJECTION AS NEEDED
Status: DISCONTINUED | OUTPATIENT
Start: 2021-10-28 | End: 2021-10-28 | Stop reason: HOSPADM

## 2021-10-28 RX ORDER — BUPIVACAINE HCL/0.9 % NACL/PF 0.125 %
PLASTIC BAG, INJECTION (ML) EPIDURAL AS NEEDED
Status: DISCONTINUED | OUTPATIENT
Start: 2021-10-28 | End: 2021-10-28 | Stop reason: SURG

## 2021-10-28 RX ORDER — BUPIVACAINE HYDROCHLORIDE 5 MG/ML
INJECTION, SOLUTION EPIDURAL; INTRACAUDAL AS NEEDED
Status: DISCONTINUED | OUTPATIENT
Start: 2021-10-28 | End: 2021-10-28 | Stop reason: SURG

## 2021-10-28 RX ORDER — FENTANYL CITRATE 50 UG/ML
INJECTION, SOLUTION INTRAMUSCULAR; INTRAVENOUS AS NEEDED
Status: DISCONTINUED | OUTPATIENT
Start: 2021-10-28 | End: 2021-10-28 | Stop reason: SURG

## 2021-10-28 RX ORDER — LIDOCAINE HYDROCHLORIDE 20 MG/ML
INJECTION, SOLUTION INFILTRATION; PERINEURAL AS NEEDED
Status: DISCONTINUED | OUTPATIENT
Start: 2021-10-28 | End: 2021-10-28 | Stop reason: SURG

## 2021-10-28 RX ORDER — SODIUM CHLORIDE, SODIUM LACTATE, POTASSIUM CHLORIDE, CALCIUM CHLORIDE 600; 310; 30; 20 MG/100ML; MG/100ML; MG/100ML; MG/100ML
1000 INJECTION, SOLUTION INTRAVENOUS CONTINUOUS
Status: DISCONTINUED | OUTPATIENT
Start: 2021-10-28 | End: 2021-10-28 | Stop reason: HOSPADM

## 2021-10-28 RX ORDER — SODIUM CHLORIDE, SODIUM LACTATE, POTASSIUM CHLORIDE, CALCIUM CHLORIDE 600; 310; 30; 20 MG/100ML; MG/100ML; MG/100ML; MG/100ML
INJECTION, SOLUTION INTRAVENOUS CONTINUOUS PRN
Status: DISCONTINUED | OUTPATIENT
Start: 2021-10-28 | End: 2021-10-28 | Stop reason: SURG

## 2021-10-28 RX ADMIN — Medication 100 MCG: at 12:04

## 2021-10-28 RX ADMIN — SODIUM CHLORIDE, POTASSIUM CHLORIDE, SODIUM LACTATE AND CALCIUM CHLORIDE: 600; 310; 30; 20 INJECTION, SOLUTION INTRAVENOUS at 10:32

## 2021-10-28 RX ADMIN — Medication 100 MCG: at 11:49

## 2021-10-28 RX ADMIN — Medication 100 MCG: at 11:46

## 2021-10-28 RX ADMIN — FENTANYL CITRATE 50 MCG: 50 INJECTION INTRAMUSCULAR; INTRAVENOUS at 10:35

## 2021-10-28 RX ADMIN — BUPIVACAINE HYDROCHLORIDE 25 ML: 5 INJECTION, SOLUTION EPIDURAL; INTRACAUDAL; PERINEURAL at 10:20

## 2021-10-28 RX ADMIN — Medication 100 MCG: at 11:42

## 2021-10-28 RX ADMIN — FENTANYL CITRATE 100 MCG: 50 INJECTION INTRAMUSCULAR; INTRAVENOUS at 10:13

## 2021-10-28 RX ADMIN — CEFAZOLIN SODIUM 2 G: 2 SOLUTION INTRAVENOUS at 10:33

## 2021-10-28 RX ADMIN — LIDOCAINE HYDROCHLORIDE 10 ML: 20 INJECTION, SOLUTION INFILTRATION; PERINEURAL at 10:40

## 2021-10-28 RX ADMIN — FAMOTIDINE 20 MG: 10 INJECTION INTRAVENOUS at 09:51

## 2021-10-28 RX ADMIN — SODIUM CHLORIDE, POTASSIUM CHLORIDE, SODIUM LACTATE AND CALCIUM CHLORIDE: 600; 310; 30; 20 INJECTION, SOLUTION INTRAVENOUS at 11:50

## 2021-10-28 RX ADMIN — BUPIVACAINE HYDROCHLORIDE 10 ML: 5 INJECTION, SOLUTION EPIDURAL; INTRACAUDAL; PERINEURAL at 10:40

## 2021-10-28 RX ADMIN — MIDAZOLAM HYDROCHLORIDE 1 MG: 1 INJECTION, SOLUTION INTRAMUSCULAR; INTRAVENOUS at 10:33

## 2021-10-28 RX ADMIN — FENTANYL CITRATE 50 MCG: 50 INJECTION INTRAMUSCULAR; INTRAVENOUS at 11:00

## 2021-10-28 RX ADMIN — Medication 100 MCG: at 12:13

## 2021-10-28 RX ADMIN — SODIUM CHLORIDE, POTASSIUM CHLORIDE, SODIUM LACTATE AND CALCIUM CHLORIDE 1000 ML: 600; 310; 30; 20 INJECTION, SOLUTION INTRAVENOUS at 09:52

## 2021-10-28 RX ADMIN — Medication 100 MCG: at 11:56

## 2021-10-28 RX ADMIN — Medication 100 MCG: at 11:36

## 2021-10-28 RX ADMIN — LIDOCAINE HYDROCHLORIDE 5 ML: 20 INJECTION, SOLUTION INFILTRATION; PERINEURAL at 10:20

## 2021-10-28 RX ADMIN — MIDAZOLAM HYDROCHLORIDE 2 MG: 1 INJECTION, SOLUTION INTRAMUSCULAR; INTRAVENOUS at 10:13

## 2021-10-28 RX ADMIN — Medication 100 MCG: at 11:52

## 2021-10-28 RX ADMIN — PROPOFOL 25 MCG/KG/MIN: 10 INJECTION, EMULSION INTRAVENOUS at 10:59

## 2021-10-28 RX ADMIN — MIDAZOLAM HYDROCHLORIDE 1 MG: 1 INJECTION, SOLUTION INTRAMUSCULAR; INTRAVENOUS at 10:50

## 2021-10-28 NOTE — ANESTHESIA PROCEDURE NOTES
Peripheral Block      Patient reassessed immediately prior to procedure    Patient location during procedure: pre-op  Start time: 10/28/2021 10:08 AM  Stop time: 10/28/2021 10:28 AM  Reason for block: procedure for pain, at surgeon's request and post-op pain management  Performed by  CRNA: Guillermo Guzman, [place]  Assisted by: Kashif Romero CRNA  Preanesthetic Checklist  Completed: patient identified, IV checked, site marked, risks and benefits discussed, surgical consent, monitors and equipment checked, pre-op evaluation and timeout performed  Prep:  Pt Position: supine  Sterile barriers:alcohol skin prep, cap, mask, partial drape, gloves and washed/disinfected hands  Prep: ChloraPrep and air dry 3 min per clock  Patient monitoring: blood pressure monitoring, continuous pulse oximetry and EKG  Procedure    Sedation: yes  Performed under: MAC  Guidance:ultrasound guided and nerve stimulator    ULTRASOUND INTERPRETATION. Using ultrasound guidance a 21 G gauge needle was placed in close proximity to the nerve, at which point, under ultrasound guidance anesthetic was injected in the area of the nerve and spread of the anesthesia was seen on ultrasound in close proximity thereto.  There were no abnormalities seen on ultrasound; a digital image was taken; and the patient tolerated the procedure with no complications. Images:still images obtained, printed/placed on chart  Loss of twitch: 0.5 mA  Laterality:left  Block Type:infraclavicular  Injection Technique:single-shot  Needle Type:echogenic  Resistance on Injection: none          Medications  Comment:Marcaine 0.5% 25 ml lidocaine 2% 5 ml    Post Assessment  Injection Assessment: negative aspiration for heme, no paresthesia on injection and incremental injection  Patient Tolerance:comfortable throughout block  Complications:no  Additional Notes  Pt has difficult anatomy d/t obesity, large neck and shoulders, deep and high clavicle

## 2021-10-28 NOTE — ANESTHESIA POSTPROCEDURE EVALUATION
Patient: Dante Nixon    Procedure Summary     Date: 10/28/21 Room / Location: [facility] OR  /  [facility]    Anesthesia Start: 1033 Anesthesia Stop:     Procedure: Wrist open reduction internal fixation (Left Wrist) Diagnosis:       Other closed intra-articular fracture of distal end of left radius, initial encounter      (Other closed intra-articular fracture of distal end of left radius, initial encounter [S52.149H])    Surgeons: Jsoe Fierro MD Provider: Kashif Romero CRNA    Anesthesia Type: MAC, regional ASA Status: 3          Anesthesia Type: MAC, regional    Vitals  No vitals data found for the desired time range.          Post Anesthesia Care and Evaluation    Patient location during evaluation: PHASE II  Patient participation: complete - patient participated  Level of consciousness: awake  Pain score: 0  Pain management: adequate  Airway patency: patent  Anesthetic complications: No anesthetic complications  PONV Status: none  Cardiovascular status: acceptable  Respiratory status: acceptable and nasal cannula  Hydration status: acceptable    Comments: vsss resp spont, reflexes intact, responsive, report given to pacu nurse

## 2021-10-28 NOTE — ANESTHESIA PROCEDURE NOTES
Peripheral Block      Patient reassessed immediately prior to procedure    Patient location during procedure: OR  Reason for block: procedure for pain, at surgeon's request and post-op pain management  Performed by  CRNA: Jeferson Thompson CRNA  Assisted by: Kashif Romero CRNA  Preanesthetic Checklist  Completed: patient identified, IV checked, site marked, risks and benefits discussed, surgical consent, monitors and equipment checked, pre-op evaluation and timeout performed  Prep:  Pt Position: supine  Sterile barriers:alcohol skin prep, cap and gloves  Prep: ChloraPrep and air dry 3 min  Patient monitoring: blood pressure monitoring, continuous pulse oximetry and EKG  Procedure    Sedation: yes  Performed under: MAC  Guidance:ultrasound guided and nerve stimulator  Images:still images not obtained    Laterality:left  Block Type:infraclavicular  Injection Technique:single-shot  Needle Type:echogenic  Needle Gauge:21 G  Resistance on Injection: none          Medications  Comment:Marcaine 0.5% 10 ml and lidocaine 2% 10 ml    Post Assessment  Injection Assessment: negative aspiration for heme, no paresthesia on injection and incremental injection  Patient Tolerance:comfortable throughout block  Complications:no  Additional Notes  Touch up  l infraclavicular r block d/t previous spotty block, good spread noted around artery, difficult block d/t anatomy deep and pt obese

## 2021-10-28 NOTE — ANESTHESIA PREPROCEDURE EVALUATION
Anesthesia Evaluation     Patient summary reviewed and Nursing notes reviewed   NPO Solid Status: > 8 hours  NPO Liquid Status: > 8 hours           Airway   Dental      Pulmonary    (+) pneumonia resolved , sleep apnea,   (-) not a smoker  Cardiovascular     (+) hypertension,   CAD:  inc risk obese, ? jet, dm.      Neuro/Psych  (+) psychiatric history Anxiety,     GI/Hepatic/Renal/Endo    (+) obesity,   diabetes mellitus type 2 poorly controlled,     Musculoskeletal     (+) arthralgias, myalgias,   Abdominal    Substance History      OB/GYN          Other        ROS/Med Hx Other: Hx of neg stress test  Pt non compliant with meds  Labs reviewed   cxr nad  ekg sr irbbb  covid pneumonia July 2021                    Anesthesia Plan    ASA 3     MAC and regional   (Risks and benefits discussed including risk of aspiration, recall and dental damage. Discussed at length with pt, surgeon and MARYLIN Guzman Crna, pt wishes to be wide awake during procedure to watch. Surgeon and Crna informed pt that even with a good block that was not in the best interest for best pt and surgical outcome, but that minimal sedation would be used and increased sedation would be used upon pt and surgeon request and discomfort. Pt agrees to the anesthesia plan.  All patient questions answered.    Plan: left infraclavicular block with iv sedation    Will continue with plan of care.)  intravenous induction     Anesthetic plan, all risks, benefits, and alternatives have been provided, discussed and informed consent has been obtained with: patient.

## 2021-11-11 ENCOUNTER — OFFICE VISIT (OUTPATIENT)
Dept: ORTHOPEDIC SURGERY | Facility: CLINIC | Age: 66
End: 2021-11-11

## 2021-11-11 VITALS — BODY MASS INDEX: 40.98 KG/M2 | RESPIRATION RATE: 18 BRPM | HEIGHT: 66 IN | TEMPERATURE: 97.8 F | WEIGHT: 255 LBS

## 2021-11-11 DIAGNOSIS — Z98.890 S/P ORIF (OPEN REDUCTION INTERNAL FIXATION) FRACTURE: ICD-10-CM

## 2021-11-11 DIAGNOSIS — Z87.81 S/P ORIF (OPEN REDUCTION INTERNAL FIXATION) FRACTURE: ICD-10-CM

## 2021-11-11 DIAGNOSIS — S69.92XA WRIST INJURY, LEFT, INITIAL ENCOUNTER: Primary | ICD-10-CM

## 2021-11-11 DIAGNOSIS — S52.502A CLOSED FRACTURE OF DISTAL END OF LEFT RADIUS, UNSPECIFIED FRACTURE MORPHOLOGY, INITIAL ENCOUNTER: ICD-10-CM

## 2021-11-11 PROCEDURE — 99024 POSTOP FOLLOW-UP VISIT: CPT | Performed by: PHYSICIAN ASSISTANT

## 2021-11-11 NOTE — PROGRESS NOTES
"Subjective   Patient ID: Dante Nixon is a 66 y.o. right hand dominant male is here today for a post-operative visit.  Post-op of the Left Wrist (ORIF 10/28/21)            Patient presents for his first postop regarding left wrist ORIF.  Date of surgery 10/28/2021.  Patient does note some soreness and swelling.  Denies numbness or tingling    History of Present Illness      Pain controlled: [] no   [x] yes   Medication refill requested: [x] no   [] yes    Patient compliant with instructions: [] no   [x] yes   Other: Reports good progress since surgery.     Past Medical History:   Diagnosis Date   • Anxiety     Does not take medication for this   • Borderline diabetes 10/26/2021    Reported he has never been on medication for diabetes    • Fall 10/21/2021    Reslting in fracture of left wrist   • H/O cardiovascular stress test     Patient reported around 1984 and that he had an exercise stress test and all wnl's at that time   • History of COVID-19 06/09/2021   • History of pneumonia    • Hypertension     Patient reported \"it's a little bit high\" but reported he has never taken Rx for this    • Snores 10/26/2021    patient reported he has never had a sleep study   • Wears glasses     Rx glasses PRN        Past Surgical History:   Procedure Laterality Date   • APPENDECTOMY      Reported at around age 12   • COLONOSCOPY      reported many years ago    • ORIF WRIST FRACTURE Left 10/28/2021    Procedure: Wrist open reduction internal fixation;  Surgeon: Jose Fierro MD;  Location: [facility];  Service: Orthopedics;  Laterality: Left;       No Known Allergies    Review of Systems   Constitutional: Negative for diaphoresis, fever and unexpected weight change.   HENT: Negative for dental problem and sore throat.    Eyes: Negative for visual disturbance.   Respiratory: Negative for shortness of breath.    Cardiovascular: Negative for chest pain.   Gastrointestinal: Negative for abdominal pain, constipation, " "diarrhea, nausea and vomiting.   Genitourinary: Negative for difficulty urinating and frequency.   Musculoskeletal: Positive for arthralgias (left wrist).   Neurological: Negative for headaches.   Hematological: Does not bruise/bleed easily.     I have reviewed the medical and surgical history, family history, social history, medications, and/or allergies, and the review of systems of this report.    Objective   Temp 97.8 °F (36.6 °C) (Skin)   Resp 18   Ht 167.6 cm (66\")   Wt 116 kg (255 lb)   BMI 41.16 kg/m²       Signs of infection: [x] no                    [] yes   Drainage: [x] no                    [] yes   Incision: [x] healing well     []healed well   Motor exam intact: [] no                    [x] yes   Neurovascular exam intact: [] no                    [x] yes   Signs of compartment syndrome: [x] no                    [] yes   Signs of DVT: [x] no                    [] yes   Other:      Physical Exam  Ortho Exam    Extremity DVT signs are negative by clinical screen.  Neurologic Exam    Assessment/Plan      Independent Review of Radiographic Studies:    X-ray of the left wrist 2 view performed in the office independently reviewed for the evaluation of distal radius fracture healing status post ORIF.  Operating films are available and reviewed.  No evidence of interval displacement or hardware loosening           Procedures     Diagnoses and all orders for this visit:    1. Wrist injury, left, initial encounter (Primary)  -     XR Wrist 3+ View Left; Future    2. Closed fracture of distal end of left radius, unspecified fracture morphology, initial encounter    3. S/P ORIF (open reduction internal fixation) fracture         Recommendations/Plan:             Ultrasound: [x]not ordered         []order given to patient   Labs: [x]not ordered         []order given to patient   Weight Bearing status: []Full []WBAT []PWB [x]NWB []Other     Regular exercise as tolerated  Guided on proper techniques for " mobility, strength, agility and/or conditioning exercises  Weight bearing parameters reviewed  Physical therapy program ongoing  Take prescribed medications as instructed only as tolerated     Patient must continue using the Velcro wrist brace.  No lifting pushing or pulling with the surgical extremity.  Follow-up in 3 weeks x-ray on arrival  Patient is encouraged and agreeable to call or return sooner for any issues or concerns.

## 2021-12-08 ENCOUNTER — OFFICE VISIT (OUTPATIENT)
Dept: ORTHOPEDIC SURGERY | Facility: CLINIC | Age: 66
End: 2021-12-08

## 2021-12-08 VITALS — HEIGHT: 66 IN | HEART RATE: 99 BPM | WEIGHT: 263.6 LBS | BODY MASS INDEX: 42.36 KG/M2

## 2021-12-08 DIAGNOSIS — S52.502A CLOSED FRACTURE OF DISTAL END OF LEFT RADIUS, UNSPECIFIED FRACTURE MORPHOLOGY, INITIAL ENCOUNTER: Primary | ICD-10-CM

## 2021-12-08 PROCEDURE — 99024 POSTOP FOLLOW-UP VISIT: CPT | Performed by: PHYSICIAN ASSISTANT

## 2021-12-08 NOTE — PROGRESS NOTES
"Subjective   Patient ID: Dante Nixon is a 66 y.o. right hand dominant male is here today for a post-operative visit.  Post-op of the Left Wrist (S/P wrist ORIF 10/28/21.)          CHIEF COMPLAINT:    History of Present Illness      Pain controlled: [] no   [x] yes   Medication refill requested: [x] no   [] yes    Patient compliant with instructions: [] no   [x] yes   Other: Reports fair progress since surgery.     Past Medical History:   Diagnosis Date   • Anxiety     Does not take medication for this   • Borderline diabetes 10/26/2021    Reported he has never been on medication for diabetes    • Fall 10/21/2021    Reslting in fracture of left wrist   • H/O cardiovascular stress test     Patient reported around 1984 and that he had an exercise stress test and all wnl's at that time   • History of COVID-19 06/09/2021   • History of pneumonia    • Hypertension     Patient reported \"it's a little bit high\" but reported he has never taken Rx for this    • Snores 10/26/2021    patient reported he has never had a sleep study   • Wears glasses     Rx glasses PRN        Past Surgical History:   Procedure Laterality Date   • APPENDECTOMY      Reported at around age 12   • COLONOSCOPY      reported many years ago    • ORIF WRIST FRACTURE Left 10/28/2021    Procedure: Wrist open reduction internal fixation;  Surgeon: Jose Fierro MD;  Location: [facility];  Service: Orthopedics;  Laterality: Left;       No Known Allergies    Review of Systems   Constitutional: Negative for diaphoresis, fever and unexpected weight change.   HENT: Negative for dental problem and sore throat.    Eyes: Negative for visual disturbance.   Respiratory: Negative for shortness of breath.    Cardiovascular: Negative for chest pain.   Gastrointestinal: Negative for abdominal pain, constipation, diarrhea, nausea and vomiting.   Genitourinary: Negative for difficulty urinating and frequency.   Musculoskeletal: Positive for arthralgias. " "  Neurological: Negative for headaches.   Hematological: Does not bruise/bleed easily.   All other systems reviewed and are negative.    I have reviewed the medical and surgical history, family history, social history, medications, and/or allergies, and the review of systems of this report.    Objective   Pulse 99   Ht 167.6 cm (66\")   Wt 120 kg (263 lb 9.6 oz)   BMI 42.55 kg/m²       Signs of infection: [x] no                    [] yes   Drainage: [x] no                    [] yes   Incision: [x] healing well     []healed well   Motor exam intact: [] no                    [x] yes   Neurovascular exam intact: [] no                    [x] yes   Signs of compartment syndrome: [x] no                    [] yes   Signs of DVT: [x] no                    [] yes   Other:      Physical Exam  Vitals and nursing note reviewed.   Constitutional:       Appearance: Normal appearance.   Pulmonary:      Effort: Pulmonary effort is normal.   Neurological:      Mental Status: He is alert and oriented to person, place, and time.       Ortho Exam    Extremity DVT signs are negative by clinical screen.  Neurologic Exam     Mental Status   Oriented to person, place, and time.       Assessment/Plan     Independent Review of Radiographic Studies:    X-ray of the left wrist 3 view performed in the office independently reviewed for the evaluation of distal radius fracture healing.  Comparison films are available reviewed.  No evidence of hardware loosening.  There is still lacking callus and periosteal bridging on today's x-ray imaging.     Procedures     Diagnoses and all orders for this visit:    1. Closed fracture of distal end of left radius, unspecified fracture morphology, initial encounter (Primary)  -     XR Wrist 3+ View Left; Future         Recommendations/Plan:     Sutures Staples or Pins [] Removed today  [] At prior visit  [] Plan removal later   Physical therapy: []rehab facility  []outpatient referral  [] therapy ongoing "   Ultrasound: [x]not ordered         []order given to patient   Labs: [x]not ordered         []order given to patient   Weight Bearing status: []Full []WBAT [x]PWB []NWB []Other     Discussion of orthopaedic goals and activities and patient and/or guardian expressed appreciation.  Regular exercise as tolerated  Guided on proper techniques for mobility, strength, agility and/or conditioning exercises  Weight bearing parameters reviewed  Take prescribed medications as instructed only as tolerated    Patient politely declined the sugar tong Ortho-Glass splint to better immobilize the forearm.  He states he would like to continue trying the Velcro wrist brace.  I explained to him that movement of his elbow could be hindering the fracture from fully healing  He will also purchase over-the-counter men's One-A-Day vitamin that has calcium and vitamin D.  Recheck in 2 weeks x-ray on arrival    Patient is encouraged and agreeable to call or return sooner for any issues or concerns.

## 2021-12-27 ENCOUNTER — OFFICE VISIT (OUTPATIENT)
Dept: ORTHOPEDIC SURGERY | Facility: CLINIC | Age: 66
End: 2021-12-27

## 2021-12-27 VITALS — HEIGHT: 66 IN | WEIGHT: 260.4 LBS | TEMPERATURE: 98 F | BODY MASS INDEX: 41.85 KG/M2

## 2021-12-27 DIAGNOSIS — S52.502A CLOSED FRACTURE OF DISTAL END OF LEFT RADIUS, UNSPECIFIED FRACTURE MORPHOLOGY, INITIAL ENCOUNTER: Primary | ICD-10-CM

## 2021-12-27 PROCEDURE — 29105 APPLICATION LONG ARM SPLINT: CPT | Performed by: PHYSICIAN ASSISTANT

## 2021-12-27 PROCEDURE — 99024 POSTOP FOLLOW-UP VISIT: CPT | Performed by: PHYSICIAN ASSISTANT

## 2021-12-27 NOTE — PROGRESS NOTES
"Subjective   Patient ID: Dante Nixon is a 66 y.o. right hand dominant male is here today for a post-operative visit.  Post-op of the Left Wrist (Status post ORIF 10/28/21.)          History of Present Illness      Pain controlled:  Patient reports he is still having some pain to the left wrist with swelling   Medication refill requested: [x] no   [] yes    Patient compliant with instructions: [] no   [x] yes   Other: Reports fair progress since surgery.     Past Medical History:   Diagnosis Date   • Anxiety     Does not take medication for this   • Borderline diabetes 10/26/2021    Reported he has never been on medication for diabetes    • Fall 10/21/2021    Reslting in fracture of left wrist   • H/O cardiovascular stress test     Patient reported around 1984 and that he had an exercise stress test and all wnl's at that time   • History of COVID-19 06/09/2021   • History of pneumonia    • Hypertension     Patient reported \"it's a little bit high\" but reported he has never taken Rx for this    • Snores 10/26/2021    patient reported he has never had a sleep study   • Wears glasses     Rx glasses PRN        Past Surgical History:   Procedure Laterality Date   • APPENDECTOMY      Reported at around age 12   • COLONOSCOPY      reported many years ago    • ORIF WRIST FRACTURE Left 10/28/2021    Procedure: Wrist open reduction internal fixation;  Surgeon: Jose Fierro MD;  Location: [facility];  Service: Orthopedics;  Laterality: Left;       No Known Allergies    Review of Systems   Constitutional: Negative for diaphoresis, fever and unexpected weight change.   HENT: Negative for dental problem and sore throat.    Eyes: Negative for visual disturbance.   Respiratory: Negative for shortness of breath.    Cardiovascular: Negative for chest pain.   Gastrointestinal: Negative for abdominal pain, constipation, diarrhea, nausea and vomiting.   Genitourinary: Negative for difficulty urinating and frequency. " "  Musculoskeletal: Positive for arthralgias.   Neurological: Negative for headaches.   Hematological: Does not bruise/bleed easily.   All other systems reviewed and are negative.    I have reviewed the medical and surgical history, family history, social history, medications, and/or allergies, and the review of systems of this report.    Objective   Temp 98 °F (36.7 °C)   Ht 167.6 cm (66\")   Wt 118 kg (260 lb 6.4 oz)   BMI 42.03 kg/m²       Signs of infection: [x] no                    [] yes   Drainage: [x] no                    [] yes   Incision: [x] healing well     []healed well   Motor exam intact: [] no                    [x] yes   Neurovascular exam intact: [] no                    [x] yes   Signs of compartment syndrome: [x] no                    [] yes   Signs of DVT: [x] no                    [] yes   Other:  There is minor edema to the digits of the left hand.     Physical Exam  Left Hand Exam     Range of Motion   Wrist   Pronation: 50   Supination: 70     Muscle Strength   Wrist extension: 4/5   Wrist flexion: 4/5   :  4/5     Other   Erythema: absent  Scars: present  Sensation: normal  Pulse: present              Neurologic Exam    Assessment/Plan     Independent Review of Radiographic Studies:    X-ray of the left wrist 3 view performed in the office independently reviewed for the evaluation of distal radius fracture healing.  Comparison films are available and reviewed.  No evidence of interval displacement.  No evidence of hardware loosening.     Procedures     Diagnoses and all orders for this visit:    1. Closed fracture of distal end of left radius, unspecified fracture morphology, initial encounter (Primary)  -     XR Wrist 3+ View Left; Future         Recommendations/Plan:             Ultrasound: [x]not ordered         []order given to patient   Labs: [x]not ordered         []order given to patient   Weight Bearing status: []Full []WBAT []PWB [x]NWB []Other     Discussion of orthopaedic " goals and activities and patient and/or guardian expressed appreciation.  Regular exercise as tolerated  Guided on proper techniques for mobility, strength, agility and/or conditioning exercises  Weight bearing parameters reviewed  Take prescribed medications as instructed only as tolerated      Patient was placed in a sugar tong Ortho-Glass splint to the left upper extremity.  Patient neurovascularly intact     I would like to immobilize the patient in a sugar tong splint x2 weeks.  Plan to enroll in therapy after the 2-week immobilization to immobilize this  Patient is encouraged and agreeable to call or return sooner for any issues or concerns.

## 2021-12-31 NOTE — DISCHARGE INSTRUCTIONS
Dilia Thorpe DNP, CHACE    [address] [place] [address]     PATIENT NAME: Eleno Cervantes  : 2008  DATE: 21  MRN: [id number]      Billing Provider: Dilia Thorpe DNP, FNP  Level of Service:   Patient PCP Information     Provider PCP Type    Carmella Kearney MD General          Reason for Visit / Chief Complaint: Sore Throat, Fever, Emesis, Fatigue, Cough, and Nausea (Symptoms started yesterday.  Patient complained of sore throat, fatigue, nausea, vomiting, and low grade fever. Patient has been exposed to covid.)       Update PCP  Update Chief Complaint         History of Present Illness / Problem Focused Workflow     Eleno Cervantes presents to the clinic with Sore Throat, Fever, Emesis, Fatigue, Cough, and Nausea (Symptoms started yesterday.  Patient complained of sore throat, fatigue, nausea, vomiting, and low grade fever. Patient has been exposed to covid.)     Pt brought by dad c/o low grade temp and recent exposure to COVID.       Review of Systems     Review of Systems   Constitutional: Positive for fever. Negative for activity change, chills and fatigue.   HENT: Positive for nasal congestion, sinus pressure/congestion and sore throat. Negative for facial swelling and sneezing.    Respiratory: Positive for cough. Negative for chest tightness, shortness of breath and wheezing.    Cardiovascular: Negative for chest pain.   Gastrointestinal: Negative for nausea.        Medical / Social / Family History     Past Medical History:   Diagnosis Date    ADHD (attention deficit hyperactivity disorder)     Autism     Autism     Insomnia        History reviewed. No pertinent surgical history.    Social History    reports that he has never smoked. He has never used smokeless tobacco. He reports that he does not use drugs.    Family History  's family history includes Depression in his maternal grandfather and sister; Diabetes in his father,  PAT PASS GIVEN/REVIEWED WITH PT.  VERBALIZED UNDERSTANDING OF THE FOLLOWING:  DO NOT EAT, DRINK, SMOKE, USE SMOKELESS TOBACCO OR CHEW GUM AFTER MIDNIGHT THE NIGHT BEFORE SURGERY.  THIS ALSO INCLUDES HARD CANDIES AND MINTS.    DO NOT SHAVE THE AREA TO BE OPERATED ON AT LEAST 48 HOURS PRIOR TO THE PROCEDURE.  DO NOT WEAR MAKE UP OR NAIL POLISH.  DO NOT LEAVE IN ANY PIERCING OR WEAR JEWELRY THE DAY OF SURGERY.      DO NOT USE ADHESIVES IF YOU WEAR DENTURES.    DO NOT WEAR EYE CONTACTS; BRING IN YOUR GLASSES.    ONLY TAKE MEDICATION THE MORNING OF YOUR PROCEDURE IF INSTRUCTED BY YOUR SURGEON WITH ENOUGH WATER TO SWALLOW THE MEDICATION.  IF YOUR SURGEON DID NOT SPECIFY WHICH MEDICATIONS TO TAKE, YOU WILL NEED TO CALL THEIR OFFICE FOR FURTHER INSTRUCTIONS AND DO AS THEY INSTRUCT.    LEAVE ANYTHING YOU CONSIDER VALUABLE AT HOME.    YOU WILL NEED TO ARRANGE FOR SOMEONE TO DRIVE YOU HOME AFTER SURGERY.  IT IS RECOMMENDED THAT YOU DO NOT DRIVE, WORK, DRINK ALCOHOL OR MAKE MAJOR DECISIONS FOR AT LEAST 24 HOURS AFTER YOUR PROCEDURE IS COMPLETE.      THE DAY OF YOUR PROCEDURE, BRING IN THE FOLLOWING IF APPLICABLE:   PICTURE ID AND INSURANCE/MEDICARE OR MEDICAID CARDS/ANY CO-PAY THAT MAY BE DUE   COPY OF ADVANCED DIRECTIVE/LIVING WILL/POWER OR    CPAP/BIPAP/INHALERS   SKIN PREP SHEET   YOUR PREADMISSION TESTING PASS (IF NOT A PHONE HISTORY)            COVID self-quarantine instructions reviewed with the pt.  Verbalized understanding.Chlorhexidine wipes given to patient with verification sheet. Patient/Family member verbalized understanding to all teaching and instruction.   maternal grandfather, and maternal grandmother; Eczema in his brother and sister; Heart disease in his father; Hyperlipidemia in his paternal grandmother; Hypertension in his father, maternal grandfather, maternal grandmother, and paternal grandfather; No Known Problems in his mother; Stroke in his paternal grandmother.    Medications and Allergies     Medications  Outpatient Medications Marked as Taking for the 12/31/21 encounter (Office Visit) with Dilia Thorpe, BABAR, FNP   Medication Sig Dispense Refill    cloNIDine (CATAPRES) 0.1 MG tablet Take 0.5 tablets (0.05 mg total) by mouth every evening. 15 tablet 2    dexmethylphenidate (FOCALIN XR) 10 MG 24 hr capsule Take 1 capsule (10 mg total) by mouth once daily. 30 capsule 0       Allergies  Review of patient's allergies indicates:  No Known Allergies    Physical Examination     Vitals:    12/31/21 1310   BP: (!) 107/58   Pulse: (!) 118   Resp: 20   Temp: 98 °F (36.7 °C)     Physical Exam  Vitals and nursing note reviewed.   Constitutional:       General: He is active. He is not in acute distress.  HENT:      Head: Normocephalic.      Nose: Nose normal. No congestion or rhinorrhea.      Mouth/Throat:      Mouth: Mucous membranes are moist.      Pharynx: No posterior oropharyngeal erythema.   Eyes:      Pupils: Pupils are equal, round, and reactive to light.   Cardiovascular:      Rate and Rhythm: Normal rate and regular rhythm.      Pulses: Normal pulses.      Heart sounds: No murmur heard.      Pulmonary:      Effort: Pulmonary effort is normal. No respiratory distress.      Breath sounds: Normal breath sounds. No wheezing or rhonchi.   Abdominal:      Palpations: Abdomen is soft.   Musculoskeletal:         General: Normal range of motion.      Cervical back: Normal range of motion and neck supple. No tenderness.   Skin:     General: Skin is warm and dry.   Neurological:      Mental Status: He is alert and oriented for age.          Assessment and Plan  (including Health Maintenance)      Problem List  Smart Sets  Document Outside HM   :    Plan:         Health Maintenance Due   Topic Date Due    COVID-19 Vaccine (1) Never done    HPV Vaccines (1 - Male 2-dose series) Never done    Influenza Vaccine (1) Never done       Problem List Items Addressed This Visit    None     Visit Diagnoses     COVID-19    -  Primary    Relevant Medications    azithromycin (ZITHROMAX Z-STEVO) 250 MG tablet    albuterol (VENTOLIN HFA) 90 mcg/actuation inhaler    Sore throat        Relevant Orders    POCT rapid strep A (Completed)    POCT SARS-COV2 (COVID) with Flu Antigen (Completed)    Vomiting, intractability of vomiting not specified, presence of nausea not specified, unspecified vomiting type        Relevant Orders    POCT SARS-COV2 (COVID) with Flu Antigen (Completed)    Fever, unspecified fever cause        Relevant Orders    POCT SARS-COV2 (COVID) with Flu Antigen (Completed)    Fatigue, unspecified type        Relevant Orders    POCT SARS-COV2 (COVID) with Flu Antigen (Completed)    Cough        Relevant Orders    POCT SARS-COV2 (COVID) with Flu Antigen (Completed)        COVID-19  -     azithromycin (ZITHROMAX Z-STEVO) 250 MG tablet; Take 2 tablets on Day 1 and 1 tablet daily for next 4 days.  Dispense: 6 tablet; Refill: 0  -     albuterol (VENTOLIN HFA) 90 mcg/actuation inhaler; Inhale 1 puff into the lungs every 6 (six) hours as needed for Wheezing.  Dispense: 8 g; Refill: 0    Sore throat  -     POCT rapid strep A  -     POCT SARS-COV2 (COVID) with Flu Antigen    Vomiting, intractability of vomiting not specified, presence of nausea not specified, unspecified vomiting type  -     POCT SARS-COV2 (COVID) with Flu Antigen    Fever, unspecified fever cause  -     POCT SARS-COV2 (COVID) with Flu Antigen    Fatigue, unspecified type  -     POCT SARS-COV2 (COVID) with Flu Antigen    Cough  -     POCT SARS-COV2 (COVID) with Flu Antigen       The patient has no Health Maintenance  topics of status Not Due        Future Appointments   Date Time Provider Department Center   2/14/2022  8:20 AM Carmella Kearney MD [facility]        Follow up if symptoms worsen or fail to improve.     Signature:  Dilia Thorpe DNP, FNP  [address] [place] [address]  Phone #: [phone]  Fax #: [phone]    Date of encounter: 12/31/21    Patient Instructions   See up to date guidelines from CDC and [place] Dept of Health.  Covid Medication List      Vitamin D 5,000 units twice a day     Zinc 50 mg twice a day     Pepcid 20 mg once a day     Zyrtec 10 mg once a day     Aspirin 325 mg once a day     Vitamin C 1000 mg twice a day     Melatonin 3 mg at bedtime     Increase fluid intake and rest!

## 2022-01-10 ENCOUNTER — OFFICE VISIT (OUTPATIENT)
Dept: ORTHOPEDIC SURGERY | Facility: CLINIC | Age: 67
End: 2022-01-10

## 2022-01-10 VITALS — HEIGHT: 66 IN | BODY MASS INDEX: 41.88 KG/M2 | WEIGHT: 260.6 LBS | TEMPERATURE: 97.8 F

## 2022-01-10 DIAGNOSIS — M25.632 WRIST STIFFNESS, LEFT: ICD-10-CM

## 2022-01-10 DIAGNOSIS — Z87.81 S/P ORIF (OPEN REDUCTION INTERNAL FIXATION) FRACTURE: Primary | ICD-10-CM

## 2022-01-10 DIAGNOSIS — Z98.890 S/P ORIF (OPEN REDUCTION INTERNAL FIXATION) FRACTURE: Primary | ICD-10-CM

## 2022-01-10 DIAGNOSIS — S52.502A CLOSED FRACTURE OF DISTAL END OF LEFT RADIUS, UNSPECIFIED FRACTURE MORPHOLOGY, INITIAL ENCOUNTER: ICD-10-CM

## 2022-01-10 PROCEDURE — 99024 POSTOP FOLLOW-UP VISIT: CPT | Performed by: PHYSICIAN ASSISTANT

## 2022-01-10 NOTE — PROGRESS NOTES
"Subjective   Patient ID: Dante Nixon is a 66 y.o. right hand dominant male  Follow-up of the Left Wrist (Status post ORIF 10/28/2021. States it still hurts a lot, presents in a splint.)         History of Present Illness  Patient is following up for scheduled visit regarding left wrist ORIF.  Date of surgery 10/28/2021.  He states he is still experiencing throbbing pain but overall has improved.  He has been immobilized a little longer than we initially anticipated because of the delayed healing.                                                 Past Medical History:   Diagnosis Date   • Anxiety     Does not take medication for this   • Borderline diabetes 10/26/2021    Reported he has never been on medication for diabetes    • Fall 10/21/2021    Reslting in fracture of left wrist   • H/O cardiovascular stress test     Patient reported around 1984 and that he had an exercise stress test and all wnl's at that time   • History of COVID-19 06/09/2021   • History of pneumonia    • Hypertension     Patient reported \"it's a little bit high\" but reported he has never taken Rx for this    • Snores 10/26/2021    patient reported he has never had a sleep study   • Wears glasses     Rx glasses PRN        Past Surgical History:   Procedure Laterality Date   • APPENDECTOMY      Reported at around age 12   • COLONOSCOPY      reported many years ago    • ORIF WRIST FRACTURE Left 10/28/2021    Procedure: Wrist open reduction internal fixation;  Surgeon: Jose Fierro MD;  Location: [facility];  Service: Orthopedics;  Laterality: Left;       Family History   Problem Relation Age of Onset   • Arthritis Mother        Social History     Socioeconomic History   • Marital status: Single   Tobacco Use   • Smoking status: Never Smoker   • Smokeless tobacco: Never Used   Vaping Use   • Vaping Use: Never used   Substance and Sexual Activity   • Alcohol use: Yes     Alcohol/week: 4.0 standard drinks     Types: 4 Cans of beer per " "week     Comment: Reported no Hx of ETOH abuse   • Drug use: Never   • Sexual activity: Defer       No current outpatient medications on file.    No Known Allergies    Review of Systems   Constitutional: Negative for fever.   HENT: Negative for dental problem and voice change.    Eyes: Negative for visual disturbance.   Respiratory: Negative for shortness of breath.    Cardiovascular: Negative for chest pain.   Gastrointestinal: Negative for abdominal pain.   Genitourinary: Negative for dysuria.   Musculoskeletal: Positive for arthralgias (left wrist) and joint swelling (left wrist). Negative for gait problem.   Skin: Negative for rash.   Neurological: Negative for speech difficulty.   Hematological: Does not bruise/bleed easily.   Psychiatric/Behavioral: Negative for confusion.       I have reviewed the medical and surgical history, family history, social history, medications, and/or allergies, and the review of systems of this report.    Objective   Temp 97.8 °F (36.6 °C)   Ht 167.6 cm (65.98\")   Wt 118 kg (260 lb 9.6 oz)   BMI 42.08 kg/m²    Physical Exam  Vitals and nursing note reviewed.   Constitutional:       Appearance: Normal appearance.   Pulmonary:      Effort: Pulmonary effort is normal.   Musculoskeletal:      Left wrist: No deformity, bony tenderness, snuff box tenderness or crepitus. Decreased range of motion (stiffness from recent immobilization). Normal pulse.      Left hand: No swelling, deformity, tenderness or bony tenderness. There is no disruption of two-point discrimination. Normal capillary refill.   Neurological:      Mental Status: He is alert and oriented to person, place, and time.   Psychiatric:         Behavior: Behavior normal.       Ortho Exam      Neurologic Exam     Mental Status   Oriented to person, place, and time.              Assessment/Plan   Independent Review of Radiographic Studies:    AP and lateral of the left wrist, indication to evaluate fracture healing, and " compared with prior imaging, shows interim fracture healing, callus and or periostitis with good maintained reduction and alignment and intact position of fracture fixation hardware.      Procedures       Diagnoses and all orders for this visit:    1. S/P ORIF (open reduction internal fixation) fracture (Primary)  -     XR Wrist 3+ View Left; Future  -     Ambulatory Referral to Physical Therapy Evaluate and treat, POST OP, Ortho; Strengthening, Stretching, ROM    2. Closed fracture of distal end of left radius, unspecified fracture morphology, initial encounter    3. Wrist stiffness, left       Orthopedic activities reviewed and patient expressed appreciation  Discussion of orthopedic goals  Risk, benefits, and merits of treatment alternatives reviewed with the patient and questions answered  Ice, heat, and/or modalities as beneficial    Recommendations/Plan:  Exercise, medications, injections, other patient advice, and return appointment as noted.  Patient is encouraged to call or return for any issues or concerns.      Patient agreeable to call or return sooner for any concerns.             EMR Dragon-transcription disclaimer:  This encounter note is an electronic transcription of spoken language to printed text.  Electronic transcription of spoken language may permit erroneous or at times nonsensical words or phrases to be inadvertently transcribed.  Although I have reviewed the note for such errors, some may still exist

## 2022-04-11 ENCOUNTER — OFFICE VISIT (OUTPATIENT)
Dept: ORTHOPEDIC SURGERY | Facility: CLINIC | Age: 67
End: 2022-04-11

## 2022-04-11 VITALS — HEIGHT: 66 IN | WEIGHT: 260.2 LBS | BODY MASS INDEX: 41.82 KG/M2 | TEMPERATURE: 97.6 F

## 2022-04-11 DIAGNOSIS — Z98.890 S/P ORIF (OPEN REDUCTION INTERNAL FIXATION) FRACTURE: Primary | ICD-10-CM

## 2022-04-11 DIAGNOSIS — S52.502D CLOSED FRACTURE OF DISTAL END OF LEFT RADIUS WITH ROUTINE HEALING, UNSPECIFIED FRACTURE MORPHOLOGY, SUBSEQUENT ENCOUNTER: ICD-10-CM

## 2022-04-11 DIAGNOSIS — Z87.81 S/P ORIF (OPEN REDUCTION INTERNAL FIXATION) FRACTURE: Primary | ICD-10-CM

## 2022-04-11 PROCEDURE — 99213 OFFICE O/P EST LOW 20 MIN: CPT | Performed by: PHYSICIAN ASSISTANT

## 2022-04-11 NOTE — PROGRESS NOTES
"Subjective   Patient ID: Dante Nixon is a 67 y.o. right hand dominant male is here today for a post-operative visit.  Post-op of the Left Wrist (S/P ORIF 1/10/22. States it is doing good. Has been doing PT three times a week. Has a little pain at times.)          CHIEF COMPLAINT:    History of Present Illness      Pain controlled: [] no   [x] yes   Medication refill requested: [x] no   [] yes    Patient compliant with instructions: [] no   [x] yes   Other: Reports good progress since surgery.  Patient states his pain is hardly noticeable.  He only has occasional pain at the base of the wrist.     Past Medical History:   Diagnosis Date   • Anxiety     Does not take medication for this   • Borderline diabetes 10/26/2021    Reported he has never been on medication for diabetes    • Fall 10/21/2021    Reslting in fracture of left wrist   • H/O cardiovascular stress test     Patient reported around 1984 and that he had an exercise stress test and all wnl's at that time   • History of COVID-19 06/09/2021   • History of pneumonia    • Hypertension     Patient reported \"it's a little bit high\" but reported he has never taken Rx for this    • Snores 10/26/2021    patient reported he has never had a sleep study   • Wears glasses     Rx glasses PRN        Past Surgical History:   Procedure Laterality Date   • APPENDECTOMY      Reported at around age 12   • COLONOSCOPY      reported many years ago    • ORIF WRIST FRACTURE Left 10/28/2021    Procedure: Wrist open reduction internal fixation;  Surgeon: Jose Fierro MD;  Location: [facility];  Service: Orthopedics;  Laterality: Left;       No Known Allergies    Review of Systems   Constitutional: Negative for fever.   HENT: Negative for dental problem and voice change.    Eyes: Negative for visual disturbance.   Respiratory: Negative for shortness of breath.    Cardiovascular: Negative for chest pain.   Gastrointestinal: Negative for abdominal pain.   Genitourinary: " "Negative for dysuria.   Musculoskeletal: Positive for arthralgias (left wrist ). Negative for gait problem and joint swelling.   Skin: Negative for rash.   Neurological: Negative for speech difficulty.   Hematological: Does not bruise/bleed easily.   Psychiatric/Behavioral: Negative for confusion.     I have reviewed the medical and surgical history, family history, social history, medications, and/or allergies, and the review of systems of this report.    Objective   Temp 97.6 °F (36.4 °C)   Ht 167.6 cm (65.98\")   Wt 118 kg (260 lb 3.2 oz)   BMI 42.02 kg/m²       Signs of infection: [x] no                    [] yes   Drainage: [x] no                    [] yes   Incision: [x] healing well     []healed well   Motor exam intact: [] no                    [x] yes   Neurovascular exam intact: [] no                    [x] yes   Signs of compartment syndrome: [x] no                    [] yes             Physical Exam  Vitals and nursing note reviewed.   Constitutional:       Appearance: Normal appearance.   Pulmonary:      Effort: Pulmonary effort is normal.   Musculoskeletal:      Left wrist: No deformity, tenderness, bony tenderness, snuff box tenderness or crepitus. Normal pulse.      Left hand: No tenderness or bony tenderness. Normal strength. Normal sensation. There is no disruption of two-point discrimination. Normal capillary refill. Normal pulse.   Neurological:      Mental Status: He is alert and oriented to person, place, and time.   Psychiatric:         Behavior: Behavior normal.        Ortho Exam    Neg Delfin's test LUE  Neg. Tinel sign LUE  Neurologic Exam     Mental Status   Oriented to person, place, and time.       Assessment/Plan     Independent Review of Radiographic Studies:    AP and lateral of the left wrist, indication to evaluate fracture healing, and compared with prior imaging, shows interm fracture healing, callus and or periostitis in continued good position and alignment.       "   Procedures     Diagnoses and all orders for this visit:    1. S/P ORIF (open reduction internal fixation) fracture (Primary)  -     XR Wrist 3+ View Left; Future    2. Closed fracture of distal end of left radius with routine healing, unspecified fracture morphology, subsequent encounter         Recommendations/Plan:                     Weight Bearing status: []Full [x]WBAT []PWB []NWB []Other     Discussion of orthopaedic goals and activities and patient and/or guardian expressed appreciation.  Regular exercise as tolerated  Guided on proper techniques for mobility, strength, agility and/or conditioning exercises  Weight bearing parameters reviewed  Take prescribed medications as instructed only as tolerated  D/w the the patient the option of painful hardware removal in the future.  He states the pain is not all that bothersome at this time.  He will contact the office should he decide to proceed    Patient is encouraged and agreeable to call or return sooner for any issues or concerns.        negative...

## (undated) DEVICE — FLEXIBLE YANKAUER,MEDIUM TIP, NO VACUUM CONTROL: Brand: ARGYLE

## (undated) DEVICE — PROXIMATE PLUS MD MULTI-DIRECTIONAL RELEASE SKIN STAPLERS CONTAINS 35 STAINLESS STEEL STAPLES APPROXIMATE CLOSED DIMENSIONS: 6.9MM X 3.9MM WIDE: Brand: PROXIMATE

## (undated) DEVICE — SLV SCD CALF HEMOFORCE DVT THERP REPROC MD

## (undated) DEVICE — PK EXTRM UPPR 20

## (undated) DEVICE — Device

## (undated) DEVICE — SUT VIC 2/0 CT1 27IN J259H

## (undated) DEVICE — BIT DRL QC DIA W/DEPTHMARK 1.8X110MM

## (undated) DEVICE — GLV SURG SENSICARE SLT PF LF 8 STRL

## (undated) DEVICE — CLAVICLE STRAP: Brand: DEROYAL

## (undated) DEVICE — TBG PENCL TELESCP MEGADYNE SMOKE EVAC 10FT

## (undated) DEVICE — GLV SURG SENSICARE ORTHO PF LF 8 STRL

## (undated) DEVICE — KWIRE TROC/TP SS 1.25X150MM NS
Type: IMPLANTABLE DEVICE | Site: WRIST | Status: NON-FUNCTIONAL
Removed: 2021-10-28